# Patient Record
Sex: MALE | Race: WHITE
[De-identification: names, ages, dates, MRNs, and addresses within clinical notes are randomized per-mention and may not be internally consistent; named-entity substitution may affect disease eponyms.]

---

## 2020-12-03 ENCOUNTER — HOSPITAL ENCOUNTER (OUTPATIENT)
Dept: HOSPITAL 62 - RDC | Age: 30
End: 2020-12-03
Attending: NURSE PRACTITIONER
Payer: COMMERCIAL

## 2020-12-03 VITALS — DIASTOLIC BLOOD PRESSURE: 72 MMHG | SYSTOLIC BLOOD PRESSURE: 165 MMHG

## 2020-12-03 DIAGNOSIS — Z20.828: Primary | ICD-10-CM

## 2020-12-03 DIAGNOSIS — I10: ICD-10-CM

## 2020-12-03 DIAGNOSIS — Z87.891: ICD-10-CM

## 2020-12-03 PROCEDURE — 99201: CPT

## 2020-12-03 PROCEDURE — 87635 SARS-COV-2 COVID-19 AMP PRB: CPT

## 2020-12-03 PROCEDURE — C9803 HOPD COVID-19 SPEC COLLECT: HCPCS

## 2020-12-03 NOTE — ER RDC ASSESSMENT REPORT
Intake





- In the Last 14 days


Have you traveled outside [place]?: No


Have you been in close contact with someone CONFIRMED: Yes


Worked in Healthcare?: No





- Symptoms


Subjective Fever(Felt feverish): No


Chills: No


Muscule Aches: No


Runny Nose: No


Sore Throat: No


Cough (New or worsening chronic cough): No


Shortness of breath: No


Nausea or Vomiting: No


Headache: No


Abdominal Pain: No


Diarrhea(3 or more loose stools in last 24 hours): No





- Do you have any of the following


Chronic lung disease: Asthma or emphysema or COPD: No


Cystic Fibrosis: No


Diabetes: No


High Blood Pressure: Yes


Cardiovascular Disease: No


Chronic Kidney Disease: No


Chronic Liver Disease: No


Chronic blood disorder like Sickle Cell Disease: No


Weak immune system due to disease or medication: No


Neurologic condition that limits movement: No


Developmental delay - Moderate to Severe: No


Morbid Obesity (>100 pounds over ideal weight): No





- Objective


Temperature: 97.6 F


Pulse Rate: 85


Respiratory Rate: 18


Blood Pressure: 165/72 - Did not take blood pressure medicine today


O2 Sat by Pulse Oximetry: 98


Objective: 


Given above, testing performed: 





covid


Disposition: Home; Selfcare





General





- General


Stated Complaint: asymptomatic, covid testing


Time Seen by Provider: 12/03/20 11:30


Mode of Arrival: Ambulatory


Information source: Patient





- HPI


Notes: 





Patient presents to clinic for COVID-19 testing after coming in close contact 

with another COVID 19 positive individual. Patient is asymptomatic.  They deny 

any cough, shortness of breath, fever, chills, muscle aches, rhinorrhea, sore 

throat, nausea or vomiting, headache, abdominal pain or diarrhea.  Patient has 

no acute medical concerns.





- Related Data


Allergies/Adverse Reactions: 


                                        





No Known Allergies Allergy (Verified 09/20/15 15:01)


   











Past Medical History





- General


Information source: Patient





- Social History


Smoking Status: Former Smoker


Family History: Reviewed & Not Pertinent





- Past Medical History


Cardiac Medical History: Reports: Hx Hypertension


Pulmonary Medical History: Reports: None


EENT Medical History: Reports: None


Neurological Medical History: Reports: None


Endocrine Medical History: Reports: None


Renal/ Medical History: Reports: None


Malignancy Medical History: Reports None


GI Medical History: Reports: None


Musculoskeletal Medical History: Reports None


Skin Medical History: Reports None


Psychiatric Medical History: Reports: None


Traumatic Medical History: Reports: None


Infectious Medical History: Reports: None


Past Surgical History: Reports: None





Physical Exam





- General


General appearance: Appears well, Alert


In distress: None


Notes: 





PHYSICAL EXAMINATION: 


GENERAL: Well-appearing and in no acute distress. 


HEAD: Atraumatic, normocephalic. 


EYES: sclera anicteric, conjunctiva are normal. 


ENT: nares patent. Moist mucous membranes. 


NECK: Normal range of motion, supple without lymphadenopathy. 


LUNGS: No increased work of breathing. Lung sounds CTAB and equal. No wheezes 

rales or rhonchi. 


HEART: Regular rate and rhythm without murmurs.


ABDOMEN: Soft, nontender, normal bowel sounds, no guarding. 


EXTREMITIES: Normal range of motion, no pitting edema. No cyanosis. 


NEUROLOGICAL: A&O x 3. Normal speech. 


PSYCH: Normal mood, normal affect. 


SKIN: Warm, Dry, normal turgor, no rashes or lesions noted








Patient Education/Counseling


Counseling/Education: 





Patient presents for COVID 19 testing after close exposure to another person who

has tested positive for COVID 19.  Patient is asymptomatic at this time. Patient

does not have emergency worrying symptoms such as difficulty breathing, 

shortness of breath, chest pain, pressure, confusion or cyanosis.  Patient 

appears suitable for discharge as vital signs are stable and patient is nontoxic

in appearance.  Good return precautions have been discussed with patient, 

patient verbalized understanding and is agreeable with discharge plan of care at

this time.


Guidance for worsening S/SX: 





As a person under investigation for Covid 19, the [place] department of 

Health and Human Services, division of public health advises you to adhere to 

the following guidance until your test results are reported to you.  If your 

test result is positive, you will receive additional information from your 

provider and your local health department at that time.


 


Remain at home until you are cleared by the health provider or public health au

thorities.


 


Keep a log of visitors to your home, notify any visitors to your home of your 

isolation status.


 


If you plan to move to a new address or leave the county, notify the local 

health department in your County.


 


Call your doctor or seek care if you have an urgent medical need.  Before 

seeking medical care, call ahead to get instructions from the provider before 

arriving at the medical office clinic or hospital.  Notify them that you are 

being tested for the virus that causes Covid 19 so that arrangements can be 

made, as necessary, to prevent transmission to others in the healthcare setting.

 Next, notify the local health department in your county.


 


If a medical emergency arises and you need to call 911, inform the first 

responders that you are being tested for the virus that causes Covid 19.  Next, 

notify the local health department in your county.





RDC Discharge





- Discharge


Clinical Impression: 


 Encounter for screening laboratory testing for COVID-19 virus in asymptomatic 

patient





Condition: Good


Disposition: Home; Selfcare